# Patient Record
Sex: FEMALE | Race: AMERICAN INDIAN OR ALASKA NATIVE
[De-identification: names, ages, dates, MRNs, and addresses within clinical notes are randomized per-mention and may not be internally consistent; named-entity substitution may affect disease eponyms.]

---

## 2021-08-26 ENCOUNTER — HOSPITAL ENCOUNTER (OUTPATIENT)
Dept: HOSPITAL 5 - TRG | Age: 26
Discharge: HOME | End: 2021-08-26
Attending: OBSTETRICS & GYNECOLOGY
Payer: COMMERCIAL

## 2021-08-26 DIAGNOSIS — Z34.93: Primary | ICD-10-CM

## 2021-08-26 DIAGNOSIS — Z3A.40: ICD-10-CM

## 2021-08-26 PROCEDURE — 76819 FETAL BIOPHYS PROFIL W/O NST: CPT

## 2021-08-26 PROCEDURE — 76815 OB US LIMITED FETUS(S): CPT

## 2021-08-26 NOTE — ULTRASOUND REPORT
ULTRASOUND OBSTETRIC LIMITED 

ULTRASOUND FETAL BIOPHYSICAL PROFILE



INDICATION / CLINICAL INFORMATION:

non reactive nst in office.





COMPARISON:

None available.



FINDINGS:



FETAL BREATHING MOVEMENT = 2

GROSS BODY MOVEMENT = 2

FETAL TONE = 2

QUALITATIVE AMNIOTIC FLUID VOLUME = 2



TOTAL BIOPHYSICAL SCORE = 8/8



FETAL HEART RATE (beats per minute): 125 



AMNIOTIC FLUID INDEX (cm) =  6.6  

PRESENTATION: Cephalic. 



ADDITIONAL FINDINGS: None.



IMPRESSION:

1. Fetal Biophysical Score = 8/8 

2. Borderline decreased amniotic fluid index of 6.6 cm



Signer Name: Brady Drew MD 

Signed: 8/26/2021 2:04 PM

Workstation Name: [id number]

## 2021-08-28 ENCOUNTER — HOSPITAL ENCOUNTER (INPATIENT)
Dept: HOSPITAL 5 - TRG | Age: 26
LOS: 4 days | Discharge: HOME | End: 2021-09-01
Attending: OBSTETRICS & GYNECOLOGY | Admitting: OBSTETRICS & GYNECOLOGY
Payer: COMMERCIAL

## 2021-08-28 DIAGNOSIS — D62: ICD-10-CM

## 2021-08-28 DIAGNOSIS — Z20.822: ICD-10-CM

## 2021-08-28 DIAGNOSIS — O41.03X0: Primary | ICD-10-CM

## 2021-08-28 DIAGNOSIS — Z3A.40: ICD-10-CM

## 2021-08-28 DIAGNOSIS — D56.3: ICD-10-CM

## 2021-08-28 DIAGNOSIS — Z23: ICD-10-CM

## 2021-08-28 LAB
HCT VFR BLD CALC: 32.6 % (ref 30.3–42.9)
HGB BLD-MCNC: 10.7 GM/DL (ref 10.1–14.3)
MCHC RBC AUTO-ENTMCNC: 33 % (ref 30–34)
MCV RBC AUTO: 89 FL (ref 79–97)
PLATELET # BLD: 193 K/MM3 (ref 140–440)
RBC # BLD AUTO: 3.65 M/MM3 (ref 3.65–5.03)

## 2021-08-28 PROCEDURE — U0003 INFECTIOUS AGENT DETECTION BY NUCLEIC ACID (DNA OR RNA); SEVERE ACUTE RESPIRATORY SYNDROME CORONAVIRUS 2 (SARS-COV-2) (CORONAVIRUS DISEASE [COVID-19]), AMPLIFIED PROBE TECHNIQUE, MAKING USE OF HIGH THROUGHPUT TECHNOLOGIES AS DESCRIBED BY CMS-2020-01-R: HCPCS

## 2021-08-28 PROCEDURE — 86850 RBC ANTIBODY SCREEN: CPT

## 2021-08-28 PROCEDURE — 90471 IMMUNIZATION ADMIN: CPT

## 2021-08-28 PROCEDURE — 85730 THROMBOPLASTIN TIME PARTIAL: CPT

## 2021-08-28 PROCEDURE — 86900 BLOOD TYPING SEROLOGIC ABO: CPT

## 2021-08-28 PROCEDURE — 85610 PROTHROMBIN TIME: CPT

## 2021-08-28 PROCEDURE — 36415 COLL VENOUS BLD VENIPUNCTURE: CPT

## 2021-08-28 PROCEDURE — 74177 CT ABD & PELVIS W/CONTRAST: CPT

## 2021-08-28 PROCEDURE — 59025 FETAL NON-STRESS TEST: CPT

## 2021-08-28 PROCEDURE — 96360 HYDRATION IV INFUSION INIT: CPT

## 2021-08-28 PROCEDURE — 76819 FETAL BIOPHYS PROFIL W/O NST: CPT

## 2021-08-28 PROCEDURE — 85018 HEMOGLOBIN: CPT

## 2021-08-28 PROCEDURE — P9016 RBC LEUKOCYTES REDUCED: HCPCS

## 2021-08-28 PROCEDURE — 80053 COMPREHEN METABOLIC PANEL: CPT

## 2021-08-28 PROCEDURE — 85027 COMPLETE CBC AUTOMATED: CPT

## 2021-08-28 PROCEDURE — 86920 COMPATIBILITY TEST SPIN: CPT

## 2021-08-28 PROCEDURE — 76815 OB US LIMITED FETUS(S): CPT

## 2021-08-28 PROCEDURE — 85014 HEMATOCRIT: CPT

## 2021-08-28 PROCEDURE — 86901 BLOOD TYPING SEROLOGIC RH(D): CPT

## 2021-08-28 PROCEDURE — 90715 TDAP VACCINE 7 YRS/> IM: CPT

## 2021-08-28 RX ADMIN — SODIUM CHLORIDE, SODIUM LACTATE, POTASSIUM CHLORIDE, AND CALCIUM CHLORIDE SCH MLS/HR: .6; .31; .03; .02 INJECTION, SOLUTION INTRAVENOUS at 17:02

## 2021-08-28 NOTE — HISTORY AND PHYSICAL REPORT
History of Present Illness


Date of examination: 21


Date of admission: 


2021


Chief complaint: 





Oligohydramnios


History of present illness: 





25 yo, G1 @ 40.5wks, initiated care with [place] [facility] at 12 weeks 

gestation.  Her pregnancy has been complicated by anemia, fetal right choroid 

plexus cyst (co-managed by [facility], not seen on 21 exam), left breast masses, 

marijuana use, oligohydramnios and silent carrier alpha-thalassemia.  Pt reports

to [facility] today for repeat GUSTAVO, with results of 4.8cm (previously 6.5cm on 

21).  Reports +FM.  Denies VB or LOF.


Labs:


O+, antibody negative; rubella immune; VDRL negative; HIV negative; HSV2 

negative; HCV ab negative; GC/Chlamydia negative; MSAFP/multiple markers screen 

negative; 1hr gtt - 88; GBS negative.





Past History


Past Medical History: no pertinent history


Past Surgical History: no surgical history


GYN History: abnormal PAP smear


Family/Genetic History: none


Social history: single, smoking (marijuana), full code.  denies: alcohol abuse, 

prescription drug abuse, IV drug use





- Obstetrical History


Expected Date of Delivery: 21


Actual Gestation: 40 Week(s) 5 Day(s) 


: 1


Para: 0


Hx # Term Pregnancies: 0


Number of  Pregnancies: 0


Spontaneous Abortions: 0


Induced : 0


Number of Living Children: 0





Medications and Allergies


                                    Allergies











Allergy/AdvReac Type Severity Reaction Status Date / Time


 


No Known Allergies Allergy   Unverified 21 13:15











Active Meds: 


Active Medications





Acetaminophen (Acetaminophen 325 Mg Tab)  650 mg PO Q4H PRN


   PRN Reason: Pain, Mild (1-3)


Butorphanol Tartrate (Butorphanol 2 Mg/1 Ml Inj)  2 mg IV Q2H PRN


   PRN Reason: Pain , Severe (7-10)


Carboprost Tromethamine (Carboprost Tromethamine 250 Mcg/1 Ml Inj)  250 mcg IM 

ONCE PRN


   PRN Reason: Uterine Bleeding


Ephedrine Sulfate (Ephedrine Sulfate 50 Mg/1 Ml Inj)  10 mg IV Q2M PRN


   PRN Reason: Hypotension


Fentanyl (Fentanyl 100 Mcg/2 Ml Inj)  100 mcg IV Q2H PRN


   PRN Reason: Pain,Severe (7-10) LABOR PAIN


Lactated Ringer's (Lactated Ringers)  1,000 mls @ 125 mls/hr IV AS DIRECT RAMIREZ


Oxytocin/Sodium Chloride (Pitocin/Ns 30 Unit/500ml)  30 units in 500 mls @ 40 

mls/hr IV TITR RAMIREZ; Protocol


Loperamide HCl (Loperamide 2 Mg Cap)  2 mg PO ONCE PRN


   PRN Reason: give with Hemabate


Methylergonovine Maleate (Methylergonovine Maleate 0.2 Mg/Ml Vial)  0.2 mg IM 

ONCE PRN


   PRN Reason: Uterine Bleeding


Mineral Oil (Mineral Oil 30 Ml Oral Liqd)  30 ml PO QHS PRN


   PRN Reason: Constipation


Misoprostol (Misoprostol 200 Mcg Tab)  800 mcg NH ONCE PRN


   PRN Reason: Uterine Bleeding


Misoprostol (Misoprostol 25 Mcg Tab)  50 mcg VG Q4H RAMIREZ


   Stop: 21 05:01


Nalbuphine HCl (Nalbuphine 10 Mg/1 Ml Inj)  10 mg IV Q2H PRN


   PRN Reason: Pain, Moderate (4-6)


Ondansetron HCl (Ondansetron 4 Mg/2 Ml Inj)  4 mg IV Q8H PRN


   PRN Reason: Nausea And Vomiting


Oxytocin (Oxytocin 10 Unit/1 Ml Inj)  10 unit IM ONCE PRN


   PRN Reason: Uterine Bleeding


Terbutaline Sulfate (Terbutaline 1 Mg/1 Ml Inj)  0.25 mg SUB-Q ONCE PRN


   PRN Reason: Hyperstimulation/Hypertonicity











Review of Systems


All systems: negative





- Vital Signs


Vital signs: 


                                   Vital Signs











Temp Pulse BP


 


 98.6 F   82   101/58 


 


 21 12:30  21 12:30  21 12:30








                                        











Temp Pulse Resp BP Pulse Ox


 


 98.6 F   82      101/58    


 


 21 12:30  21 12:32     21 12:32   














- Physical Exam


Breasts: Positive: normal


Cardiovascular: Regular rate


Lungs: Positive: Normal air movement


Abdomen: Positive: other (gravid )


Genitourinary (Female): Positive: normal external genitalia, normal perenium


Vagina: Positive: normal moisture


Uterus: Positive: enlarged (S=D)


Extremities: Positive: normal


Deep Tendon Reflex Grade: Normal +2





- Obstetrical


FHR: category 1


Uterine Contraction Monitor Mode: External


Cervical Dilatation: 1 (vertex)


Cervical Effacement Percentage: 50


Fetal station: -3


Uterine Contraction Pattern: Absent


Uterine Tone Measurement Phase: Resting





Results


All other labs normal.








Assessment and Plan





- Patient Problems


(1) Oligohydramnios in cramer pregnancy in third trimester


Current Visit: Yes   Status: Acute   


Plan to address problem: 


Admit to L&D


 Initiate IOL with cytotec 50 mcg po every 4 hrs x 4 doses as tolerated


 Pain meds as desired per orders


 Anticipate 








(2) Anemia


Current Visit: Yes   Status: Acute   


Qualifiers: 


   Anemia type: iron deficiency 


Plan to address problem: 


Asymptomatic


 Continue oral iron supplementation PP








(3) Alpha thalassemia silent carrier


Current Visit: Yes   Status: Acute

## 2021-08-28 NOTE — ULTRASOUND REPORT
ULTRASOUND OBSTETRIC LIMITED 



INDICATION / CLINICAL INFORMATION: GUSTAVO.

Clinical Gestational Age (GA) in weeks, days: 40, 5 



TECHNIQUE: Transabdominal.



COMPARISON: Ultrasound dated 08/26/21



FINDINGS:



FETAL HEART RATE (beats per minute): 106 



AMNIOTIC FLUID INDEX (cm) =  4.8   (normal = 7-24 cm)



PRESENTATION: Cephalic. 



ADDITIONAL FINDINGS: None.



IMPRESSION:

1. Below normal amniotic fluid index of 4.8 cm, previously 6.6 cm.



Signer Name: JASE Hernandez MD 

Signed: 8/28/2021 1:13 PM

Workstation Name: [id number]

## 2021-08-29 RX ADMIN — SODIUM CHLORIDE, SODIUM LACTATE, POTASSIUM CHLORIDE, AND CALCIUM CHLORIDE SCH MLS/HR: .6; .31; .03; .02 INJECTION, SOLUTION INTRAVENOUS at 10:42

## 2021-08-29 RX ADMIN — SODIUM CHLORIDE, SODIUM LACTATE, POTASSIUM CHLORIDE, AND CALCIUM CHLORIDE SCH MLS/HR: .6; .31; .03; .02 INJECTION, SOLUTION INTRAVENOUS at 09:29

## 2021-08-29 RX ADMIN — SODIUM CHLORIDE, SODIUM LACTATE, POTASSIUM CHLORIDE, AND CALCIUM CHLORIDE SCH MLS/HR: .6; .31; .03; .02 INJECTION, SOLUTION INTRAVENOUS at 00:33

## 2021-08-29 NOTE — PROCEDURE NOTE
OB Delivery Note





- Delivery


Date of Delivery: 21


Surgeon: BECKY GARCIA


Estimated blood loss: other (600 mL)





-  Section


Preop diagnosis: nonreassuring FHR tracing


Postop diagnosis: same


 section procedure:  section, primary low transverse


Disposition: PACU


Complications: none





- Infant


  ** A


Apgar at 1 minute: 8


Apgar at 5 minutes: 9


Infant Gender: Male (Weight 7 pounds 4 ounces) Reviewed. No change. Continue to monitor.

## 2021-08-29 NOTE — PROGRESS NOTE
Spinal Anesthesia Block





- Spinal Anesthesia Block


Start Time: 16:50


Stop Time: 17:00


Performed by:: KAYLAN CELESTIN


Procedure: 





Spinal anesthesia block is being performed for [C/S].  H&P, labs have been 

reviewed.  Patient's questions and concerns have been answered.  Informed 

consent has been performed.  Timeout has was performed.  Patient in sitting 

position on side of bed.  Sterile prep and drape was performed.  3 mL 1% 

lidocaine skin wheal at L [3]-L [4].  Needle introducer advanced.  25-gauge 

spinal needle advanced, [+] CSF [-] blood.  [Marcaine 10mg and Precedex 5mcg] 

Spinal dose was given.  All needles removed.  Patient tolerated procedure well.

## 2021-08-29 NOTE — ANESTHESIA DAY OF SURGERY
Anesthesia Day of Surgery





- Day of Surgery


Patient Examined: Yes


Patient H&P Reviewed: Yes


Patient is NPO: Yes


Beta Blockers: No


Cardiac Clearance: No


Pulmonary Clearance: No


Sukhwinder's Test: N/A

## 2021-08-29 NOTE — PROGRESS NOTE
Assessment and Plan





- Patient Problems


(1) Oligohydramnios in cramer pregnancy in third trimester


Current Visit: Yes   Status: Acute   


Plan to address problem: 


Only one dose of Cytotec given as Yolanda not properly showing ctxs


 Changed to external toco for accurate ctx monitoring


 Attempted to place nice balloon but was unsuccessful


 Initiate Pitocin titration as tolerated


 Pain meds as desired per orders


 Anticipate 








(2) Anemia


Current Visit: Yes   Status: Acute   


Qualifiers: 


   Anemia type: iron deficiency 


Plan to address problem: 


Asymptomatic


 Continue oral iron supplementation PP








(3) Alpha thalassemia silent carrier


Current Visit: Yes   Status: Acute   





Subjective





- Subjective


Date of service: 21


Principal diagnosis: IOL secondary to oligohydramnios


Interval history: 





27 yo, G1 @ 40.5wks, initiated care with [place] [facility] at 12 weeks 

gestation.  Her pregnancy has been complicated by anemia, fetal right choroid 

plexus cyst (co-managed by [facility], not seen on 21 exam), left breast masses, 

marijuana use, oligohydramnios and silent carrier alpha-thalassemia.  Pt reports

to [facility] today for repeat GUSTAVO, with results of 4.8cm (previously 6.5cm on 

21).  Reports +FM.  Denies VB or LOF.


Labs:


O+, antibody negative; rubella immune; VDRL negative; HIV negative; HSV2 

negative; HCV ab negative; GC/Chlamydia negative; MSAFP/multiple markers screen 

negative; 1hr gtt - 88; GBS negative.


Patient reports: fetal movement normal, contractions ("I don't feel them"), no 

new complaints, no loss of fluid, no vaginal bleeding





Objective





- Vital Signs


Vital Signs: 


                               Vital Signs - 12hr











  21





  17:52 17:57 18:02


 


Pulse Rate 70 72 74


 


Blood Pressure   


 


O2 Sat by Pulse 99 99 99





Oximetry   














  21





  18:07 18:12 18:17


 


Pulse Rate 76 71 71


 


Blood Pressure   


 


O2 Sat by Pulse 98 100 98





Oximetry   














  21





  18:22 18:27 18:32


 


Pulse Rate 83 91 H 79


 


Blood Pressure   


 


O2 Sat by Pulse 99 99 99





Oximetry   














  21





  18:37 18:42 18:47


 


Pulse Rate 74 77 75


 


Blood Pressure   


 


O2 Sat by Pulse 98 99 99





Oximetry   














  21





  18:52 18:57 19:02


 


Pulse Rate 76 77 85


 


Blood Pressure   


 


O2 Sat by Pulse 99 98 99





Oximetry   














  21





  19:07 19:12 19:17


 


Pulse Rate 72 80 86


 


Blood Pressure   


 


O2 Sat by Pulse 99 100 99





Oximetry   














  21





  19:22 19:27 19:32


 


Pulse Rate 77 79 96 H


 


Blood Pressure   


 


O2 Sat by Pulse 99 99 100





Oximetry   














  21





  19:37 19:42 19:47


 


Pulse Rate 81 85 86


 


Blood Pressure   


 


O2 Sat by Pulse 100 99 100





Oximetry   














  21





  19:52 19:57 20:02


 


Pulse Rate 89 81 106 H


 


Blood Pressure   


 


O2 Sat by Pulse 99 100 100





Oximetry   














  21





  20:07 20:12 20:17


 


Pulse Rate 74 74 85


 


Blood Pressure   


 


O2 Sat by Pulse 100 100 99





Oximetry   














  21





  20:22 20:27 20:32


 


Pulse Rate 91 H 79 85


 


Blood Pressure   


 


O2 Sat by Pulse 99 99 99





Oximetry   














  21





  20:39 20:44 20:49


 


Pulse Rate 76 83 77


 


Blood Pressure   


 


O2 Sat by Pulse 100 100 99





Oximetry   














  21





  20:54 20:59 21:04


 


Pulse Rate 90 81 85


 


Blood Pressure   


 


O2 Sat by Pulse 99 100 99





Oximetry   














  21





  21:09 21:14 21:19


 


Pulse Rate 79 79 76


 


Blood Pressure   


 


O2 Sat by Pulse 99 99 100





Oximetry   














  21





  21:24 21:29 21:34


 


Pulse Rate 74 76 90


 


Blood Pressure   


 


O2 Sat by Pulse 99 99 99





Oximetry   














  21





  21:39 21:44 21:49


 


Pulse Rate 79 78 88


 


Blood Pressure   


 


O2 Sat by Pulse 99 100 99





Oximetry   














  21





  21:54 21:59 22:04


 


Pulse Rate 77 93 H 79


 


Blood Pressure   


 


O2 Sat by Pulse 99 100 99





Oximetry   














  21





  22:09 22:14 22:19


 


Pulse Rate 78 74 85


 


Blood Pressure   


 


O2 Sat by Pulse 99 99 99





Oximetry   














  21





  22:24 22:29 22:34


 


Pulse Rate 81 73 78


 


Blood Pressure   


 


O2 Sat by Pulse 99 99 99





Oximetry   














  21





  22:39 22:44 22:49


 


Pulse Rate 90 77 83


 


Blood Pressure   


 


O2 Sat by Pulse 100 100 100





Oximetry   














  21





  22:54 22:59 23:04


 


Pulse Rate 73 78 79


 


Blood Pressure   


 


O2 Sat by Pulse 100 100 100





Oximetry   














  21





  23:09 23:14 23:19


 


Pulse Rate 80 71 84


 


Blood Pressure   


 


O2 Sat by Pulse 99 100 99





Oximetry   














  21





  23:24 23:29 23:34


 


Pulse Rate 78 84 76


 


Blood Pressure   


 


O2 Sat by Pulse 99 99 100





Oximetry   














  21





  23:39 23:44 23:49


 


Pulse Rate 79 67 74


 


Blood Pressure   


 


O2 Sat by Pulse 99 99 99





Oximetry   














  21





  23:54 23:59 00:04


 


Pulse Rate 68 63 65


 


Blood Pressure   


 


O2 Sat by Pulse 99 99 98





Oximetry   














  21





  00:09 00:14 00:19


 


Pulse Rate 72 70 70


 


Blood Pressure   


 


O2 Sat by Pulse 99 100 100





Oximetry   














  21





  00:52 00:57 01:02


 


Pulse Rate 65 69 89


 


Blood Pressure   


 


O2 Sat by Pulse 99 99 99





Oximetry   














  21





  01:07 01:12 01:17


 


Pulse Rate 75 67 67


 


Blood Pressure   


 


O2 Sat by Pulse 100 100 99





Oximetry   














  21





  01:22 01:27 01:32


 


Pulse Rate 64 65 69


 


Blood Pressure   


 


O2 Sat by Pulse 100 100 99





Oximetry   














  21





  01:37 01:42 01:44


 


Pulse Rate 66 69 58 L


 


Blood Pressure   122/71


 


O2 Sat by Pulse 99 98 





Oximetry   














  21





  01:47 01:52 01:57


 


Pulse Rate 68 67 67


 


Blood Pressure   


 


O2 Sat by Pulse 100 99 98





Oximetry   














  21





  02:02 02:07 02:11


 


Pulse Rate 60 63 65


 


Blood Pressure   111/64


 


O2 Sat by Pulse 100 100 





Oximetry   














  21





  02:12 02:17 02:22


 


Pulse Rate 66 84 82


 


Blood Pressure   


 


O2 Sat by Pulse 100 99 99





Oximetry   














  21





  02:27 02:32 02:37


 


Pulse Rate 63 64 61


 


Blood Pressure   


 


O2 Sat by Pulse 99 100 100





Oximetry   














  21





  02:42 02:47 02:52


 


Pulse Rate 65 63 64


 


Blood Pressure   


 


O2 Sat by Pulse 99 98 98





Oximetry   














  21





  02:57 03:02 03:07


 


Pulse Rate 62 63 73


 


Blood Pressure   


 


O2 Sat by Pulse 98 98 99





Oximetry   














  21





  03:14 03:19 03:24


 


Pulse Rate 75 68 60


 


Blood Pressure   


 


O2 Sat by Pulse 100 99 99





Oximetry   














  21





  03:29 03:34 03:39


 


Pulse Rate 57 L 60 62


 


Blood Pressure   


 


O2 Sat by Pulse 99 99 98





Oximetry   














  21





  03:44 03:49 03:54


 


Pulse Rate 59 L 60 59 L


 


Blood Pressure   


 


O2 Sat by Pulse 99 99 98





Oximetry   














  21





  03:59 04:04 04:09


 


Pulse Rate 67 60 59 L


 


Blood Pressure   


 


O2 Sat by Pulse 100 100 100





Oximetry   














  21





  04:14 04:19 04:24


 


Pulse Rate 57 L 58 L 58 L


 


Blood Pressure   


 


O2 Sat by Pulse 100 100 100





Oximetry   














  21





  04:29 04:34 04:47


 


Pulse Rate 60 58 L 62


 


Blood Pressure   


 


O2 Sat by Pulse 100 100 87





Oximetry   














  21





  04:52 04:57 05:02


 


Pulse Rate 63 58 L 61


 


Blood Pressure   


 


O2 Sat by Pulse 100 100 100





Oximetry   














  21





  05:07 05:12 05:17


 


Pulse Rate 70 71 77


 


Blood Pressure   


 


O2 Sat by Pulse 100 100 100





Oximetry   














  21





  05:22 05:27 05:32


 


Pulse Rate 90 63 63


 


Blood Pressure   


 


O2 Sat by Pulse 99 100 100





Oximetry   














  21





  05:37 05:42


 


Pulse Rate 62 75


 


Blood Pressure  


 


O2 Sat by Pulse 100 99





Oximetry  














- Exam


Breasts: deferred


Cardiovascular: Regular rate


Lungs: Normal air movement


FHR: category 1


Uterine Contraction Monitor Mode: External


Cervical Dilatation: 1.5 (vertex)


Cervical Effacement Percentage: 60


Fetal station: -3


Uterine Contraction Frequency (min): 3-6


Uterine Contraction Pattern: Irregular


Uterine Tone Measurement Phase: Resting


Uterine Contraction Intensity: Mild





- Labs


Labs: 


                                  Abnormal Labs











  21





  16:30


 


WBC  13.7 H








                         Laboratory Results - last 24 hr











  21





  16:30 16:30


 


WBC  13.7 H 


 


RBC  3.65 


 


Hgb  10.7 


 


Hct  32.6 


 


MCV  89 


 


MCH  29 


 


MCHC  33 


 


RDW  13.2 


 


Plt Count  193 


 


Blood Type   O POSITIVE


 


Antibody Screen   Negative

## 2021-08-29 NOTE — ANESTHESIA CONSULTATION
Anesthesia Consult and Med Hx


Date of service: 08/29/21





- Airway


Anesthetic Teeth Evaluation: Good


ROM Head & Neck: Adequate


Mental/Hyoid Distance: Adequate


Mallampati Class: Class II


Intubation Access Assessment: Probably Good





- Pulmonary Exam


CTA: Yes





- Cardiac Exam


Cardiac Exam: RRR





- Pre-Operative Health Status


ASA Pre-Surgery Classification: ASA2


Proposed Anesthetic Plan: Spinal


Nerve Block: TAP





- Pulmonary


Hx Smoking: No


Hx Asthma: No


Hx Sleep Apnea: No





- Cardiovascular System


Hx Hypertension: No


Hx Heart Attack/AMI: No


Hx Angina: No





- Central Nervous System


Hx Seizures: No


Hx Psychiatric Problems: No





- Gastrointestinal


Hx Gastroesophageal Reflux Disease: No





- Endocrine


Hx Renal Disease: No


Hx Liver Disease: No


Hx Insulin Dependent Diabetes: No


Hx Non-Insulin Dependent Diabetes: No


Hx Hypothyroidism: No


Hx Hyperthyroidism: No





- Hematic


Hx Anemia: No


Hx Sickle Cell Disease: No





- Other Systems


Hx Alcohol Use: No
po supplement

## 2021-08-29 NOTE — OPERATIVE REPORT
Operative Report


Operative Report: 


Date of surgery: 2021


 


Preoperative diagnosis: Pregnancy at 40 for 6 weeks; nonreassuring heart rate 

tracing; remote from delivery; oligohydramnios


 


Postoperative diagnosis: Same as above


 


Procedure: Primary low transverse  delivery


 


Surgeon: Candice Pagan M.D.


 


Anesthesia: Regional


 


Estimated blood loss:600ml





IV fluids: 1300 mL





Urine output: 150 mL


 


Findings: Liveborn male infant with Apgars of 8 and 9 weight 7 pounds 4 ounces


 


Indications: 26-year-old G1, P0 at 40+6 weeks who presents for induction of 

labor secondary to oligohydramnios.  The patient's intrapartum course is 

complicated by nonreassuring fetal heart rate tracing with the use of Pitocin 

augmentation.


 


Procedure:


     The patient was taken to the operating room and given regional anesthesia 

without complication.  She was prepped and draped in a normal sterile fashion.  

A Pfannenstiel skin incision was made down to layer the fascia which was nicked 

in the midline 


 


extended laterally with the Bovie cautery.  The superior aspect of the rectus 

fascia was grasped with [place]a clamps x2 and the rectus muscles  off 

sharply.  This was done in inferior fashion as well.  The rectus muscle 

 midline and peritoneum 


 


entered bluntly.  An Ap retractor was then inserted.  A bladder blade was 

placed.  The vesicouterine peritoneum was then entered sharply with Metzenbaum 

scissors.  A bladder flap was created digitally.  A low transverse uterine 

incision was then made and 


 


extended digitally.  There was clear fluid  upon entry into the uterine cavity. 

The fetal head was delivered through the incision with fundal pressure.  The 

cord was clamped and cut x2 and infant was passed off to pediatrics.  The 

placenta was then manually 


 


extracted.  The uterus was then exteriorized and cleared of clots and debris.  

The uterine incision was then closed in a running locked fashion with 0 Vicryl 

additional imbricating stitch was applied for 2 layer closure.  The serosa was 

then reapproximated with 3-0 


 


Vicryl.  The posterior cul-de-sac was then copiously irrigated.  The uterus was 

replaced back into the abdomen and pelvis were the gutters were then irrigated. 

The Ap retractor was then removed.  The peritoneum was then reapproximated 

with 3-0 Vicryl 


 


incorporating the rectus muscle.  The fascia was then closed with 0 Vicryl in a 

running fashion.  The skin was then reapproximated with 3-0 Monocryl on a Khari 

needle subcuticular fashion.  Steri-Strips to place across the incision and a 

Crede procedures 


 


performed at the end of the surgery.  A pressure dressing was applied to the 

incision.  The surgery productive of a liveborn male infant with Apgars of 8 and

9 weight 7 pounds 4 ounces.  The patient was taken to the recovery room in 

stable condition.  All sponge laps and needle 


 


counts correct x2.

## 2021-08-30 LAB
HCT VFR BLD CALC: 20.4 % (ref 30.3–42.9)
HGB BLD-MCNC: 6.6 GM/DL (ref 10.1–14.3)

## 2021-08-30 RX ADMIN — KETOROLAC TROMETHAMINE PRN MG: 30 INJECTION, SOLUTION INTRAMUSCULAR at 06:01

## 2021-08-30 RX ADMIN — KETOROLAC TROMETHAMINE SCH MG: 30 INJECTION, SOLUTION INTRAMUSCULAR at 18:45

## 2021-08-30 RX ADMIN — KETOROLAC TROMETHAMINE PRN MG: 30 INJECTION, SOLUTION INTRAMUSCULAR at 00:35

## 2021-08-30 RX ADMIN — DEXTROSE, SODIUM CHLORIDE, SODIUM LACTATE, POTASSIUM CHLORIDE, AND CALCIUM CHLORIDE SCH MLS/HR: 5; .6; .31; .03; .02 INJECTION, SOLUTION INTRAVENOUS at 11:22

## 2021-08-30 RX ADMIN — OXYCODONE AND ACETAMINOPHEN PRN TAB: 5; 325 TABLET ORAL at 23:53

## 2021-08-30 RX ADMIN — OXYCODONE AND ACETAMINOPHEN PRN TAB: 5; 325 TABLET ORAL at 11:21

## 2021-08-30 RX ADMIN — DEXTROSE, SODIUM CHLORIDE, SODIUM LACTATE, POTASSIUM CHLORIDE, AND CALCIUM CHLORIDE SCH MLS/HR: 5; .6; .31; .03; .02 INJECTION, SOLUTION INTRAVENOUS at 04:16

## 2021-08-31 LAB
ALBUMIN SERPL-MCNC: 2.5 G/DL (ref 3.9–5)
ALT SERPL-CCNC: 7 UNITS/L (ref 7–56)
APTT BLD: 24.7 SEC. (ref 24.2–36.6)
BUN SERPL-MCNC: 6 MG/DL (ref 7–17)
BUN/CREAT SERPL: 12 %
CALCIUM SERPL-MCNC: 8.2 MG/DL (ref 8.4–10.2)
HCT VFR BLD CALC: 17.2 % (ref 30.3–42.9)
HEMOLYSIS INDEX: 0
HGB BLD-MCNC: 5.6 GM/DL (ref 10.1–14.3)
INR PPP: 0.9 (ref 0.87–1.13)

## 2021-08-31 PROCEDURE — 30233N1 TRANSFUSION OF NONAUTOLOGOUS RED BLOOD CELLS INTO PERIPHERAL VEIN, PERCUTANEOUS APPROACH: ICD-10-PCS | Performed by: OBSTETRICS & GYNECOLOGY

## 2021-08-31 RX ADMIN — KETOROLAC TROMETHAMINE SCH MG: 30 INJECTION, SOLUTION INTRAMUSCULAR at 05:38

## 2021-08-31 RX ADMIN — DOCUSATE SODIUM SCH: 100 CAPSULE, LIQUID FILLED ORAL at 10:35

## 2021-08-31 RX ADMIN — FERROUS SULFATE TAB 325 MG (65 MG ELEMENTAL FE) SCH: 325 (65 FE) TAB at 10:12

## 2021-08-31 RX ADMIN — KETOROLAC TROMETHAMINE SCH MG: 30 INJECTION, SOLUTION INTRAMUSCULAR at 00:30

## 2021-08-31 NOTE — EVENT NOTE
Date: 08/31/21





On-call MD called by RN regarding repeat hemoglobin of 5.6. Abdomen soft with 

minimal lochia. CT abdomen/pelvis ordered to exclude hematoma. Type and cross 

and transfuse 2 units of PRBCs. Continue to monitor clinically.

## 2021-08-31 NOTE — CAT SCAN REPORT
CT ABDOMEN AND PELVIS WITH CONTRAST



INDICATION / CLINICAL INFORMATION: s/p  on 21, decreasing hemoglobin OMNI 300 100 ML.



TECHNIQUE: Axial CT images were obtained through the abdomen and pelvis after IV contrast.  All CT sc
ans at this location are performed using CT dose reduction for ALARA by means of automated exposure c
ontrol. 



COMPARISON: None available.



FINDINGS:



LOWER CHEST: Trace bilateral pleural effusions.

LIVER: Normal aside from a tiny cyst adjacent to the falciform ligament.

GALLBLADDER: No significant abnormality.  

BILE DUCTS: No significant abnormality.

PANCREAS: No significant abnormality.

SPLEEN: No significant abnormality.

ADRENALS: No significant abnormality.

RIGHT KIDNEY / URETER: No significant abnormality.

LEFT KIDNEY / URETER: No significant abnormality.



STOMACH / SMALL BOWEL: No significant abnormality. 

COLON: No significant abnormality. 

APPENDIX: Not visualized.  

PERITONEUM: There is air in the abdomen which is expected after  section. There is a 5.1 x 1.
6 cm collection anterior to the urinary bladder which likely represents hematoma.

LYMPH NODES: No significant adenopathy.

AORTA / ARTERIES: No significant abnormality. 

IVC / VEINS: No significant abnormality.



URINARY BLADDER: Small amount of gas, likely secondary to recent Matthew placement.

REPRODUCTIVE ORGANS: The uterus is enlarged and edematous and contains gas and small amount of blood 
in the endometrial cavity, expected after recent  section. No definite bladder flap hematoma.




ADDITIONAL FINDINGS: The inferior rectus abdominis muscles are enlarged measuring 13.0 x 4.2 x 9.4 cm
 compatible with intramuscular hematoma. No active extravasation is seen from the inferior epigastric
 arteries.. There is expected postoperative gas in the subcutaneous tissues and fascial planes.



SKELETAL SYSTEM: No significant abnormality.



IMPRESSION:

1. The inferior rectus abdominous muscles are enlarged concerning for intramuscular rectus abdominis 
hematoma. No evidence of active extravasation. There is additional intraperitoneal hematoma anterior 
to the urinary bladder as above. No significant bladder flap hematoma seen.

2. Postsurgical changes from recent  delivery as described above. 



Signer Name: Sherif Irwin MD 

Signed: 2021 11:21 AM

Workstation Name: [email or web address]

## 2021-08-31 NOTE — PROGRESS NOTE
Assessment and Plan





A: POD#2 s/p primary  section


    Acute blood loss anemia





P: Continue routine postop care


    Monitor for symptoms of anemia 





Subjective





- Subjective


Date of service: 21


Principal diagnosis: s/p primary cesaren, acute blood loss anemia 


Interval history: 





Pt without complaints. 


Patient reports: appetite normal, pain well controlled, flatus, ambulating 

normally, no dizzy ambulation, no bowel movement


[place]: doing well





Objective





- Vital Signs


Latest vital signs: 


                                   Vital Signs











  Temp Pulse Resp BP BP Pulse Ox Pulse Ox


 


 21 23:34  98.8 F  87  20  110/68   100 


 


 21 20:50        100


 


 21 15:30  98.5 F  85  18   112/58  


 


 21 08:30  97.6 F  78  18   95/57  


 


 21 08:00        100








                                Intake and Output











 21





 14:59 22:59 06:59


 


Intake Total 1247.5 440 360


 


Output Total 900 800 


 


Balance 347.5 -360 360


 


Intake:   


 


  .5  


 


    D5lr 1,000 ml @ 125 mls/ 887.5  





    hr IV AS DIRECT RAMIREZ Rx#:   





    [id number]   


 


  Oral 360 440 


 


  Intake, Free Water   360


 


Output:   


 


  Urine 900 800 


 


    Void 900 800 


 


Other:   


 


  Total, Intake Amount 360 120 


 


  Total, Output Amount 900 800 


 


  # Voids   


 


    Void 2 1 1














- Exam


Breasts: Present: deferred


Abdomen: Present: soft


Uterus: Present: fundal height at umbilicus


Extremities: Present: normal


Incision: Present: dressed





- Labs


Labs: 


                              Abnormal lab results











  21 Range/Units





  09:29 


 


Hgb  6.6 L D  (10.1-14.3)  gm/dl


 


Hct  20.4 L D  (30.3-42.9)  %

## 2021-09-01 VITALS — DIASTOLIC BLOOD PRESSURE: 85 MMHG | SYSTOLIC BLOOD PRESSURE: 138 MMHG

## 2021-09-01 LAB
HCT VFR BLD CALC: 24.6 % (ref 30.3–42.9)
HGB BLD-MCNC: 8.3 GM/DL (ref 10.1–14.3)

## 2021-09-01 PROCEDURE — 3E0234Z INTRODUCTION OF SERUM, TOXOID AND VACCINE INTO MUSCLE, PERCUTANEOUS APPROACH: ICD-10-PCS | Performed by: OBSTETRICS & GYNECOLOGY

## 2021-09-01 RX ADMIN — DOCUSATE SODIUM SCH MG: 100 CAPSULE, LIQUID FILLED ORAL at 00:22

## 2021-09-01 RX ADMIN — FERROUS SULFATE TAB 325 MG (65 MG ELEMENTAL FE) SCH MG: 325 (65 FE) TAB at 00:22

## 2021-09-01 RX ADMIN — DOCUSATE SODIUM SCH MG: 100 CAPSULE, LIQUID FILLED ORAL at 10:03

## 2021-09-01 RX ADMIN — OXYCODONE AND ACETAMINOPHEN PRN TAB: 5; 325 TABLET ORAL at 05:48

## 2021-09-01 RX ADMIN — FERROUS SULFATE TAB 325 MG (65 MG ELEMENTAL FE) SCH MG: 325 (65 FE) TAB at 10:03

## 2021-09-01 RX ADMIN — OXYCODONE AND ACETAMINOPHEN PRN TAB: 5; 325 TABLET ORAL at 00:22

## 2021-09-01 NOTE — DISCHARGE SUMMARY
Providers





- Providers


Date of Admission: 


21 14:23





Date of discharge: 21


Attending physician: 


BECKY GARCIA





Primary care physician: 


SYDNI PERES








Hospitalization


Reason for admission: induction of labor


Delivery: 


Procedure: primary low transverse


Episiotomy: none


Laceration: none


Incision: intact (steri-strips intact, abdominal binder in place)


Other postpartum procedures: none


Postpartum complications: transfusion (2 units of PRBCs received)


Discharge diagnosis: IUP at term delivered


 baby: male


Hospital course: 





5 yo, G1 @ 40.5wks, initiated care with [facility] at 12 weeks 

gestation.  Her pregnancy has been complicated by anemia, fetal right choroid 

plexus cyst (co-managed by MFM, not seen on 21 exam), left breast masses, 

marijuana use, oligohydramnios and silent carrier alpha-thalassemia.  Pt reports

to [facility] today for repeat GUSTAVO, with results of 4.8cm (previously 6.5cm on 

21).  Reports +FM.  Denies VB or LOF.  The patient's intrapartum course is 

complicated by nonreassuring fetal heart rate tracing with the use of Pitocin 

augmentation.


Condition at discharge: Stable


Disposition: 01 HOME / SELF CARE / HOMELESS





- Discharge Diagnoses


(1) Anemia


Status: Acute   


Qualifiers: 


   Anemia type: other cause   Other causes of anemia: acute posthemorrhagic   

Qualified Code(s): D62 - Acute posthemorrhagic anemia   


Comment: Asymptomatic


Increase iron rich foods into diet


Continue daily oral iron supplementation as directed   





(2) Alpha thalassemia silent carrier


Status: Acute   





Plan





- Discharge Medications


Prescriptions: 


Docusate Sodium [Colace] 100 mg PO BID PRN #60 capsule


 PRN Reason: Constipation


Ferrous Sulfate [Feosol 325 MG tab] 325 mg PO TID #90 tablet


Ibuprofen [Motrin] 600 mg PO Q6H PRN #30 tablet


 PRN Reason: Pain


oxyCODONE /ACETAMINOPHEN [Percocet 5/325] 1 tab PO Q6HR PRN #30 tablet


 PRN Reason: Pain





- Provider Discharge Summary


Activity: routine, no sex for 6 weeks, no heavy lifting 4 weeks, no strenuous 

exercise


Diet: other (Iron rich diet)


Instructions: routine


Additional instructions: 


[]  Smoking cessation referral if applicable(refer to patient education folder 

for contact #)


[]  Refer to [facility] Booklet








Call your doctor immediately for:


* Fever > 100.5


* Heavy vaginal bleeding ( >1 pad per hour)


* Severe persistent headache


* Shortness of breath


* Reddened, hot, painful area to leg or breast


* Drainage or odor from incision.





* Keep incision clean and dry at all times and follow doctor's instructions 

regarding bathing/showering











- Follow up plan


Follow up: 


SYDNI PERES MD [Primary Care Provider] - 14 Days

## 2022-05-27 ENCOUNTER — HOSPITAL ENCOUNTER (EMERGENCY)
Dept: HOSPITAL 5 - ED | Age: 27
LOS: 1 days | End: 2022-05-28
Payer: COMMERCIAL

## 2022-05-27 VITALS — SYSTOLIC BLOOD PRESSURE: 109 MMHG | DIASTOLIC BLOOD PRESSURE: 56 MMHG

## 2022-05-27 DIAGNOSIS — Z04.1: Primary | ICD-10-CM

## 2022-05-27 DIAGNOSIS — Z53.21: ICD-10-CM
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
